# Patient Record
Sex: FEMALE | Race: WHITE | Employment: FULL TIME | URBAN - METROPOLITAN AREA
[De-identification: names, ages, dates, MRNs, and addresses within clinical notes are randomized per-mention and may not be internally consistent; named-entity substitution may affect disease eponyms.]

---

## 2017-05-12 LAB — MAMMOGRAPHY, EXTERNAL: ABNORMAL

## 2018-09-28 ENCOUNTER — HOSPITAL ENCOUNTER (EMERGENCY)
Age: 48
Discharge: HOME | End: 2018-09-28
Attending: EMERGENCY MEDICINE
Payer: COMMERCIAL

## 2018-09-28 ENCOUNTER — APPOINTMENT (OUTPATIENT)
Dept: CT IMAGING | Age: 48
End: 2018-09-28
Payer: COMMERCIAL

## 2018-09-28 VITALS
DIASTOLIC BLOOD PRESSURE: 66 MMHG | RESPIRATION RATE: 14 BRPM | HEIGHT: 66 IN | WEIGHT: 125 LBS | BODY MASS INDEX: 20.09 KG/M2 | HEART RATE: 76 BPM | OXYGEN SATURATION: 98 % | TEMPERATURE: 98.2 F | SYSTOLIC BLOOD PRESSURE: 126 MMHG

## 2018-09-28 DIAGNOSIS — S76.012D STRAIN OF LEFT HIP, SUBSEQUENT ENCOUNTER: ICD-10-CM

## 2018-09-28 DIAGNOSIS — S09.90XA CLOSED HEAD INJURY, INITIAL ENCOUNTER: Primary | ICD-10-CM

## 2018-09-28 DIAGNOSIS — S01.81XA CHIN LACERATION, INITIAL ENCOUNTER: ICD-10-CM

## 2018-09-28 PROCEDURE — 90471 IMMUNIZATION ADMIN: CPT | Performed by: EMERGENCY MEDICINE

## 2018-09-28 PROCEDURE — 6370000000 HC RX 637 (ALT 250 FOR IP): Performed by: EMERGENCY MEDICINE

## 2018-09-28 PROCEDURE — 6360000002 HC RX W HCPCS: Performed by: EMERGENCY MEDICINE

## 2018-09-28 PROCEDURE — 12011 RPR F/E/E/N/L/M 2.5 CM/<: CPT

## 2018-09-28 PROCEDURE — 70486 CT MAXILLOFACIAL W/O DYE: CPT

## 2018-09-28 PROCEDURE — 90715 TDAP VACCINE 7 YRS/> IM: CPT | Performed by: EMERGENCY MEDICINE

## 2018-09-28 PROCEDURE — 99284 EMERGENCY DEPT VISIT MOD MDM: CPT

## 2018-09-28 RX ORDER — CYCLOBENZAPRINE HCL 10 MG
10 TABLET ORAL 3 TIMES DAILY PRN
Qty: 20 TABLET | Refills: 0 | Status: SHIPPED | OUTPATIENT
Start: 2018-09-28 | End: 2018-10-08

## 2018-09-28 RX ORDER — ORPHENADRINE CITRATE 100 MG/1
100 TABLET, EXTENDED RELEASE ORAL ONCE
Status: COMPLETED | OUTPATIENT
Start: 2018-09-28 | End: 2018-09-28

## 2018-09-28 RX ORDER — IBUPROFEN 600 MG/1
600 TABLET ORAL ONCE
Status: COMPLETED | OUTPATIENT
Start: 2018-09-28 | End: 2018-09-28

## 2018-09-28 RX ORDER — HYDROCODONE BITARTRATE AND ACETAMINOPHEN 5; 325 MG/1; MG/1
1 TABLET ORAL 3 TIMES DAILY PRN
Qty: 12 TABLET | Refills: 0 | Status: SHIPPED | OUTPATIENT
Start: 2018-09-28 | End: 2018-10-01

## 2018-09-28 RX ADMIN — IBUPROFEN 600 MG: 600 TABLET ORAL at 08:14

## 2018-09-28 RX ADMIN — ORPHENADRINE CITRATE 100 MG: 100 TABLET, EXTENDED RELEASE ORAL at 08:15

## 2018-09-28 RX ADMIN — TETANUS TOXOID, REDUCED DIPHTHERIA TOXOID AND ACELLULAR PERTUSSIS VACCINE, ADSORBED 0.5 ML: 5; 2.5; 8; 8; 2.5 SUSPENSION INTRAMUSCULAR at 08:13

## 2018-09-28 ASSESSMENT — PAIN SCALES - GENERAL
PAINLEVEL_OUTOF10: 7
PAINLEVEL_OUTOF10: 8
PAINLEVEL_OUTOF10: 4
PAINLEVEL_OUTOF10: 8

## 2018-09-28 ASSESSMENT — PAIN DESCRIPTION - LOCATION
LOCATION: LEG

## 2018-09-28 ASSESSMENT — PAIN DESCRIPTION - DESCRIPTORS: DESCRIPTORS: ACHING;CONSTANT;DISCOMFORT

## 2018-09-28 ASSESSMENT — PAIN DESCRIPTION - PAIN TYPE
TYPE: ACUTE PAIN
TYPE: ACUTE PAIN

## 2018-09-28 ASSESSMENT — PAIN DESCRIPTION - ORIENTATION: ORIENTATION: LEFT;UPPER

## 2018-09-28 NOTE — ED PROVIDER NOTES
and vital signs as below have been reviewed. /66   Pulse 76   Temp 98.2 °F (36.8 °C)   Resp 14   Ht 5' 6\" (1.676 m)   Wt 125 lb (56.7 kg)   LMP 2018   SpO2 98%   BMI 20.18 kg/m²   Oxygen Saturation Interpretation: Normal      ---------------------------------------------------PHYSICAL EXAM--------------------------------------      Constitutional/General: Alert and oriented x3, well appearing, non toxic in NAD  Head: NC, 2.5 cm laceration on chin. No active bleeding. No foreign bodies visualized. Eyes: PERRL, EOMI  Mouth: Oropharynx clear, handling secretions, no trismus. Negative tongue blade test. Mild crepitance noted at the right TMJ. No loose dentition. No crepitance of the mandible. Neck: Supple, full ROM, no midline cervical spine tenderness  Pulmonary: Lungs clear to auscultation bilaterally. Not in respiratory distress  Cardiovascular:  Regular rate and rhythm. 2+ distal pulses  Abdomen: Soft, non tender, non distended,   Back: No midline thoracic or lumbar spine tenderness. No bony crepitance. Extremities: Moves all extremities x 4. Warm and well perfused. Patient has small abrasions to both wrists on the ventral surface. No tenderness or deformities of bilateral wrists. Normal range of motion. Small abrasions also noted to the knees without bony tenderness. Tenderness of the left hamstrings. No overlying erythema or ecchymosis. Skin: warm and dry without rash  Neurologic: GCS 15,  Psych: Normal Affect      ------------------------------ ED COURSE/MEDICAL DECISION MAKING----------------------  Medications   Tetanus-Diphth-Acell Pertussis (BOOSTRIX) injection 0.5 mL (0.5 mLs Intramuscular Given 18 0813)   ibuprofen (ADVIL;MOTRIN) tablet 600 mg (600 mg Oral Given 18 0814)   orphenadrine (NORFLEX) extended release tablet 100 mg (100 mg Oral Given 1815)         Medical Decision Makin  Discussed results of imaging with patient.  She states she will follow-up with Dr. Dora Rodriguez who has been following her for this hip pain. She was offered crutches but states she has not home. She'll be prescribed something for pain and a muscle relaxer. LACERATION REPAIR  PROCEDURE NOTE:  Unless otherwise indicated, this procedure was done or directly supervised by the ED attending. Laceration #: 1. Location: chin  Length: 2.5cm. The wound area was irrigated with sterile saline and draped in a sterile fashion. The wound area was anesthetized with Lidocaine 1% without epinephrine. WOUND COMPLEXITY:    Debridement: None. Undermining: None. Wound Margins Revised: None. Flaps Aligned: yes. The wound was explored with the following results No foreign bodies found. The wound was closed with 5-0 Ethilon using interrupted sutures    Total number suture: 4  Counseling: The emergency provider has spoken with the patient and discussed todays results, in addition to providing specific details for the plan of care and counseling regarding the diagnosis and prognosis. Questions are answered at this time and they are agreeable with the plan.      --------------------------------- IMPRESSION AND DISPOSITION ---------------------------------    IMPRESSION  1. Closed head injury, initial encounter    2. Chin laceration, initial encounter    3.  Strain of left hip, subsequent encounter        DISPOSITION  Disposition: Discharge to home  Patient condition is good                Carlie Mcmanus DO  09/28/18 8414

## 2018-09-28 NOTE — ED NOTES
Laceration chin area abrasion nose pain on palpation left upper leg area pt alert denies hitting head     Mark Smith RN  09/28/18 9977

## 2021-03-18 ENCOUNTER — IMMUNIZATION (OUTPATIENT)
Dept: PRIMARY CARE CLINIC | Age: 51
End: 2021-03-18
Payer: COMMERCIAL

## 2021-03-18 PROCEDURE — 91301 COVID-19, MODERNA VACCINE 100MCG/0.5ML DOSE: CPT | Performed by: NURSE PRACTITIONER

## 2021-03-18 PROCEDURE — 0011A COVID-19, MODERNA VACCINE 100MCG/0.5ML DOSE: CPT | Performed by: NURSE PRACTITIONER

## 2021-04-15 ENCOUNTER — IMMUNIZATION (OUTPATIENT)
Dept: PRIMARY CARE CLINIC | Age: 51
End: 2021-04-15
Payer: COMMERCIAL

## 2021-04-15 PROCEDURE — 91301 COVID-19, MODERNA VACCINE 100MCG/0.5ML DOSE: CPT | Performed by: NURSE PRACTITIONER

## 2021-04-15 PROCEDURE — 0012A COVID-19, MODERNA VACCINE 100MCG/0.5ML DOSE: CPT | Performed by: NURSE PRACTITIONER

## 2021-04-18 ENCOUNTER — HOSPITAL ENCOUNTER (EMERGENCY)
Age: 51
Discharge: HOME | End: 2021-04-18
Payer: COMMERCIAL

## 2021-04-18 VITALS
RESPIRATION RATE: 16 BRPM | HEART RATE: 76 BPM | OXYGEN SATURATION: 98 % | WEIGHT: 125 LBS | SYSTOLIC BLOOD PRESSURE: 124 MMHG | DIASTOLIC BLOOD PRESSURE: 74 MMHG | HEIGHT: 66 IN | BODY MASS INDEX: 20.09 KG/M2 | TEMPERATURE: 98.2 F

## 2021-04-18 DIAGNOSIS — N30.01 ACUTE CYSTITIS WITH HEMATURIA: Primary | ICD-10-CM

## 2021-04-18 LAB
BACTERIA: ABNORMAL /HPF
BILIRUBIN URINE: NEGATIVE
BLOOD, URINE: ABNORMAL
CLARITY: ABNORMAL
COLOR: YELLOW
GLUCOSE URINE: NEGATIVE MG/DL
HCG(URINE) PREGNANCY TEST: NEGATIVE
KETONES, URINE: NEGATIVE MG/DL
LEUKOCYTE ESTERASE, URINE: ABNORMAL
NITRITE, URINE: POSITIVE
PH UA: 5 (ref 5–9)
PROTEIN UA: 100 MG/DL
RBC UA: >20 /HPF (ref 0–2)
SPECIFIC GRAVITY UA: >=1.03 (ref 1–1.03)
UROBILINOGEN, URINE: 0.2 E.U./DL
WBC UA: >20 /HPF (ref 0–5)

## 2021-04-18 PROCEDURE — 99211 OFF/OP EST MAY X REQ PHY/QHP: CPT

## 2021-04-18 PROCEDURE — 81025 URINE PREGNANCY TEST: CPT

## 2021-04-18 PROCEDURE — 87186 SC STD MICRODIL/AGAR DIL: CPT

## 2021-04-18 PROCEDURE — 81001 URINALYSIS AUTO W/SCOPE: CPT

## 2021-04-18 PROCEDURE — 87088 URINE BACTERIA CULTURE: CPT

## 2021-04-18 RX ORDER — PHENAZOPYRIDINE HYDROCHLORIDE 200 MG/1
200 TABLET, FILM COATED ORAL 3 TIMES DAILY PRN
Qty: 6 TABLET | Refills: 0 | Status: SHIPPED | OUTPATIENT
Start: 2021-04-18 | End: 2021-04-20

## 2021-04-18 RX ORDER — BUPROPION HYDROCHLORIDE 300 MG/1
300 TABLET ORAL EVERY MORNING
COMMUNITY

## 2021-04-18 RX ORDER — CEFDINIR 300 MG/1
300 CAPSULE ORAL 2 TIMES DAILY
Qty: 14 CAPSULE | Refills: 0 | Status: SHIPPED | OUTPATIENT
Start: 2021-04-18 | End: 2021-04-25

## 2021-04-21 LAB
ORGANISM: ABNORMAL
URINE CULTURE, ROUTINE: ABNORMAL

## 2021-05-24 LAB — PAP SMEAR, EXTERNAL: NORMAL

## 2021-11-22 LAB — MAMMOGRAPHY, EXTERNAL: NORMAL

## 2022-05-26 LAB
HPV, INTERPRETATION: NOT DETECTED
PAP SMEAR, EXTERNAL: NORMAL
PAP SMEAR: NORMAL

## 2022-07-24 ENCOUNTER — HOSPITAL ENCOUNTER (EMERGENCY)
Age: 52
Discharge: HOME | End: 2022-07-24
Payer: COMMERCIAL

## 2022-07-24 VITALS
TEMPERATURE: 97.5 F | SYSTOLIC BLOOD PRESSURE: 121 MMHG | DIASTOLIC BLOOD PRESSURE: 86 MMHG | WEIGHT: 128 LBS | OXYGEN SATURATION: 100 % | BODY MASS INDEX: 20.57 KG/M2 | HEIGHT: 66 IN | RESPIRATION RATE: 20 BRPM | HEART RATE: 73 BPM

## 2022-07-24 DIAGNOSIS — R19.7 DIARRHEA, UNSPECIFIED TYPE: Primary | ICD-10-CM

## 2022-07-24 PROCEDURE — 99211 OFF/OP EST MAY X REQ PHY/QHP: CPT

## 2022-07-24 PROCEDURE — 87324 CLOSTRIDIUM AG IA: CPT

## 2022-07-24 PROCEDURE — 87449 NOS EACH ORGANISM AG IA: CPT

## 2022-07-24 ASSESSMENT — PAIN - FUNCTIONAL ASSESSMENT: PAIN_FUNCTIONAL_ASSESSMENT: NONE - DENIES PAIN

## 2022-07-24 NOTE — ED PROVIDER NOTES
[facility]  Department of Emergency Medicine  UC Encounter Note  22   9:05 AM EDT      NAME: Tristin Keita  :  1970  MRN:  [id number]    Chief Complaint: Diarrhea (Started week ago after being on antibiotic  for uti   has not stopped)      This is a 78-year-old female the presents to urgent care complaining of diarrhea x1 week. Patient states that about a week ago or so she was placed on an antibiotic Bactrim for urinary tract infection. She had only taken it 2 days when she started having diarrhea. She complains of a liquid stool. But she goes multiple times throughout the day. She denies any fevers or chills. No abdominal pain no nausea or vomiting. She does state that she is keeping up with her food and fluid intake. She denies any back pain. She denies any urinary symptoms at this time. She denies any recent travel. On first contact patient she is in no acute distress. She has not tried anything for diarrhea yet. Review of Systems  Pertinent positives and negatives are stated within HPI, all other systems reviewed and are negative. Physical Exam  Vitals and nursing note reviewed. Constitutional:       Appearance: She is well-developed. HENT:      Head: Normocephalic and atraumatic. Right Ear: Hearing and external ear normal.      Left Ear: Hearing and external ear normal.      Nose: Nose normal.      Mouth/Throat:      Pharynx: Uvula midline. Eyes:      General: Lids are normal.      Conjunctiva/sclera: Conjunctivae normal.      Pupils: Pupils are equal, round, and reactive to light. Cardiovascular:      Rate and Rhythm: Normal rate and regular rhythm. Heart sounds: Normal heart sounds. No murmur heard. Pulmonary:      Effort: Pulmonary effort is normal.      Breath sounds: Normal breath sounds. Abdominal:      General: Bowel sounds are normal.      Palpations: Abdomen is soft. Abdomen is not rigid. Tenderness:  There is no abdominal tenderness. There is no right CVA tenderness, left CVA tenderness, guarding or rebound. Musculoskeletal:      Cervical back: Normal range of motion and neck supple. Skin:     General: Skin is warm and dry. Findings: No abrasion or rash. Neurological:      Mental Status: She is alert and oriented to person, place, and time. GCS: GCS eye subscore is 4. GCS verbal subscore is 5. GCS motor subscore is 6. Cranial Nerves: No cranial nerve deficit. Sensory: No sensory deficit. Coordination: Coordination normal.      Gait: Gait normal.       Procedures    MDM  Number of Diagnoses or Management Options  Diarrhea, unspecified type  Diagnosis management comments: Patient in no acute distress. Abdomen is benign. We did discuss treatment options I told her at this point try some Imodium I will give her some instructions. Also if she can give a stool sample we can check it for C. difficile. I did tell her to follow-up very closely with her primary care provider if worse go to the ER. Instructions given.             --------------------------------------------- PAST HISTORY ---------------------------------------------  Past Medical History:  has a past medical history of Depression. Past Surgical History:  has no past surgical history on file. Social History:  reports that she has never smoked. She has never used smokeless tobacco. She reports that she does not drink alcohol and does not use drugs. Family History: family history is not on file. The patients home medications have been reviewed. Allergies: Patient has no known allergies. -------------------------------------------------- RESULTS -------------------------------------------------  No results found for this visit on 07/24/22.   No orders to display       ------------------------- NURSING NOTES AND VITALS REVIEWED ---------------------------   The nursing notes within the ED encounter and vital signs as below have been reviewed. /86   Pulse 73   Temp 97.5 °F (36.4 °C) (Infrared)   Resp 20   Ht 5' 6\" (1.676 m)   Wt 128 lb (58.1 kg)   LMP 04/16/2021   SpO2 100%   BMI 20.66 kg/m²   Oxygen Saturation Interpretation: Normal      ------------------------------------------ PROGRESS NOTES ------------------------------------------   I have spoken with the patient and discussed todays results, in addition to providing specific details for the plan of care and counseling regarding the diagnosis and prognosis. Their questions are answered at this time and they are agreeable with the plan.      --------------------------------- ADDITIONAL PROVIDER NOTES ---------------------------------     This patient is stable for discharge. I have shared the specific conditions for return, as well as the importance of follow-up. * NOTE: This report was transcribed using voice recognition software. Every effort was made to ensure accuracy; however, inadvertent computerized transcription errors may be present.    --------------------------------- IMPRESSION AND DISPOSITION ---------------------------------    IMPRESSION  1.  Diarrhea, unspecified type        DISPOSITION  Disposition: Discharge to home  Patient condition is good       Arminda Koch PA-C  07/24/22 2135

## 2022-07-24 NOTE — DISCHARGE INSTRUCTIONS
Imodium:  Initial: 4 mg, followed by 2 mg every 2 to 4 hours or after each loose stool: maximum is 16 mg/day.      Sample needed for Cdiff

## 2022-07-25 LAB — C DIFF TOXIN/ANTIGEN: NORMAL

## 2022-07-31 ENCOUNTER — HOSPITAL ENCOUNTER (EMERGENCY)
Age: 52
Discharge: HOME | End: 2022-07-31
Payer: COMMERCIAL

## 2022-07-31 VITALS
BODY MASS INDEX: 20.18 KG/M2 | OXYGEN SATURATION: 99 % | SYSTOLIC BLOOD PRESSURE: 112 MMHG | HEART RATE: 88 BPM | TEMPERATURE: 97.7 F | RESPIRATION RATE: 16 BRPM | WEIGHT: 125 LBS | DIASTOLIC BLOOD PRESSURE: 81 MMHG

## 2022-07-31 DIAGNOSIS — N30.01 ACUTE CYSTITIS WITH HEMATURIA: Primary | ICD-10-CM

## 2022-07-31 LAB
BACTERIA: ABNORMAL /HPF
BILIRUBIN URINE: ABNORMAL
BLOOD, URINE: ABNORMAL
CLARITY: ABNORMAL
COLOR: ABNORMAL
EPITHELIAL CELLS, UA: ABNORMAL /HPF
GLUCOSE URINE: NEGATIVE MG/DL
KETONES, URINE: NEGATIVE MG/DL
LEUKOCYTE ESTERASE, URINE: ABNORMAL
NITRITE, URINE: NEGATIVE
PH UA: 7 (ref 5–9)
PROTEIN UA: >=300 MG/DL
RBC UA: ABNORMAL /HPF (ref 0–2)
SPECIFIC GRAVITY UA: 1.02 (ref 1–1.03)
UROBILINOGEN, URINE: 0.2 E.U./DL
WBC UA: >20 /HPF (ref 0–5)

## 2022-07-31 PROCEDURE — 99211 OFF/OP EST MAY X REQ PHY/QHP: CPT

## 2022-07-31 PROCEDURE — 81001 URINALYSIS AUTO W/SCOPE: CPT

## 2022-07-31 RX ORDER — CIPROFLOXACIN 500 MG/1
500 TABLET, FILM COATED ORAL 2 TIMES DAILY
Qty: 20 TABLET | Refills: 0 | Status: SHIPPED | OUTPATIENT
Start: 2022-07-31 | End: 2022-08-10

## 2022-07-31 ASSESSMENT — PAIN SCALES - GENERAL: PAINLEVEL_OUTOF10: 0

## 2022-07-31 ASSESSMENT — PAIN - FUNCTIONAL ASSESSMENT: PAIN_FUNCTIONAL_ASSESSMENT: 0-10

## 2022-07-31 NOTE — ED PROVIDER NOTES
HPI:  7/31/22, Time: 10:54 AM EDT         Slick Forde is a 45 y.o. female presenting to the ED for dysuria, urinary urgency, suprapubic pain/pressure, beginning this morning. The complaint has been persistent, mild in severity, and worsened by nothing. She reports similar symptoms with urinary tract infections in the past.  Is also having some hematuria with it. Denies any back pain. Afebrile without recent travel or sick contacts. No nausea vomiting or diarrhea. Was recently prescribed an antibiotic on 7/13/2022 for urinary tract infection. She was prescribed Bactrim however discontinued after 2 days due to the diarrhea. This is since resolved and urinary tract infection symptoms resolved after the 2 days of Bactrim however they did start again this morning. Patient denies all other symptoms at this time. Review of Systems:   A complete review of systems was performed and pertinent positives and negatives are stated within HPI, all other systems reviewed and are negative.          --------------------------------------------- PAST HISTORY ---------------------------------------------  Past Medical History:  has a past medical history of Depression. Past Surgical History:  has no past surgical history on file. Social History:  reports that she has never smoked. She has never used smokeless tobacco. She reports that she does not drink alcohol and does not use drugs. Family History: family history is not on file. The patients home medications have been reviewed. Allergies: Patient has no known allergies.     -------------------------------------------------- RESULTS -------------------------------------------------  All laboratory and radiology results have been personally reviewed by myself   LABS:  Results for orders placed or performed during the hospital encounter of 07/31/22   Urinalysis   Result Value Ref Range    Color, UA RED (A) Straw/Yellow    Clarity, UA CLOUDY (A) Clear Glucose, Ur Negative Negative mg/dL    Bilirubin Urine SMALL (A) Negative    Ketones, Urine Negative Negative mg/dL    Specific Gravity, UA 1.025 1.005 - 1.030    Blood, Urine LARGE (A) Negative    pH, UA 7.0 5.0 - 9.0    Protein, UA >=300 (A) Negative mg/dL    Urobilinogen, Urine 0.2 <2.0 E.U./dL    Nitrite, Urine Negative Negative    Leukocyte Esterase, Urine SMALL (A) Negative   Microscopic Urinalysis   Result Value Ref Range    WBC, UA >20 (A) 0 - 5 /HPF    RBC, UA PACKED 0 - 2 /HPF    Epithelial Cells, UA RARE /HPF    Bacteria, UA FEW (A) None Seen /HPF       RADIOLOGY:  Interpreted by Radiologist.  No orders to display       ------------------------- NURSING NOTES AND VITALS REVIEWED ---------------------------   The nursing notes within the ED encounter and vital signs as below have been reviewed. /81   Pulse 88   Temp 97.7 °F (36.5 °C)   Resp 16   Wt 125 lb (56.7 kg)   LMP 04/16/2021   SpO2 99%   BMI 20.18 kg/m²   Oxygen Saturation Interpretation: Normal      ---------------------------------------------------PHYSICAL EXAM--------------------------------------      Constitutional/General: Alert and oriented x3, well appearing, non toxic in NAD  Head: Normocephalic and atraumatic  Eyes: PERRL, EOMI  Mouth: Oropharynx clear, handling secretions, no trismus  Neck: Supple, full ROM,   Abdomen: Soft, non tender, non distended,   Extremities: Moves all extremities x 4. Warm and well perfused  Skin: warm and dry without rash  Neurologic: GCS 15,  Psych: Normal Affect      ------------------------------ ED COURSE/MEDICAL DECISION MAKING----------------------  Medications - No data to display      ED COURSE:       Medical Decision Making:    Patient is a 49-year-old female presenting to urgent care today with signs and symptoms of urinary tract infection. There is evidence of UTI on urinalysis.   Otherwise patient is nontoxic-appearing, afebrile, no acute distress therefore plan on outpatient management with antibiotics. Patient reports that she generally gets ciprofloxacin for her urinary tract infections. Understands the risks of taking this medication. Advised follow-up with PCP for recheck and return to the emergency department with any new or worsening symptoms. Patient voiced understanding is agreeable to the above treatment plan. Counseling: The emergency provider has spoken with the patient and discussed todays results, in addition to providing specific details for the plan of care and counseling regarding the diagnosis and prognosis. Questions are answered at this time and they are agreeable with the plan.      --------------------------------- IMPRESSION AND DISPOSITION ---------------------------------    IMPRESSION  1. Acute cystitis with hematuria        DISPOSITION  Disposition: Discharge to home  Patient condition is stable      NOTE: This report was transcribed using voice recognition software.  Every effort was made to ensure accuracy; however, inadvertent computerized transcription errors may be present        Sara Pool  07/31/22 1112

## 2023-07-19 LAB
BILIRUBIN, URINE: NEGATIVE
BLOOD, URINE: NEGATIVE
CLARITY, UA: CLEAR
COLOR, UA: YELLOW
GLUCOSE URINE: NEGATIVE
KETONES, URINE: NEGATIVE
LEUKOCYTE ESTERASE, URINE: NEGATIVE
NITRITE, URINE: NEGATIVE
PH UA: 7 (ref 4.5–8)
PROTEIN UA: NEGATIVE
SPECIFIC GRAVITY UA: 1.01 (ref 1–1.03)
UROBILINOGEN, URINE: NORMAL

## 2024-12-11 LAB
ALBUMIN: 4.5 G/DL
ALP BLD-CCNC: 43 U/L
ALT SERPL-CCNC: 15 U/L
ANION GAP SERPL CALCULATED.3IONS-SCNC: NORMAL MMOL/L
AST SERPL-CCNC: 19 U/L
BASOPHILS ABSOLUTE: 21 /ΜL
BASOPHILS RELATIVE PERCENT: 0.4 %
BILIRUB SERPL-MCNC: 0.5 MG/DL (ref 0.1–1.4)
BUN BLDV-MCNC: 17 MG/DL
CALCIUM SERPL-MCNC: 9.8 MG/DL
CHLORIDE BLD-SCNC: 104 MMOL/L
CHOLESTEROL, TOTAL: 229 MG/DL
CHOLESTEROL/HDL RATIO: 3.1
CO2: 30 MMOL/L
CREAT SERPL-MCNC: 0.73 MG/DL
EOSINOPHILS ABSOLUTE: 42 /ΜL
EOSINOPHILS RELATIVE PERCENT: 0.8 %
ESTIMATED AVERAGE GLUCOSE: ABNORMAL
GFR, ESTIMATED: 96
GLUCOSE BLD-MCNC: 88 MG/DL
HBA1C MFR BLD: 5.9 %
HCT VFR BLD CALC: 41.7 % (ref 36–46)
HDLC SERPL-MCNC: 73 MG/DL (ref 35–70)
HEMOGLOBIN: 13.7 G/DL (ref 12–16)
LDL CHOLESTEROL: 135
LYMPHOCYTES ABSOLUTE: 2369 /ΜL
LYMPHOCYTES RELATIVE PERCENT: 44.7 %
MAMMOGRAPHY, EXTERNAL: ABNORMAL
MCH RBC QN AUTO: 30 PG
MCHC RBC AUTO-ENTMCNC: 32.9 G/DL
MCV RBC AUTO: 91.2 FL
MONOCYTES ABSOLUTE: 350 /ΜL
MONOCYTES RELATIVE PERCENT: 6.6 %
NEUTROPHILS ABSOLUTE: 2518 /ΜL
NEUTROPHILS RELATIVE PERCENT: 47.5 %
NONHDLC SERPL-MCNC: 156 MG/DL
PLATELET # BLD: 232 K/ΜL
PMV BLD AUTO: 12.4 FL
POTASSIUM SERPL-SCNC: 3.9 MMOL/L
RBC # BLD: 4.57 10^6/ΜL
SODIUM BLD-SCNC: 142 MMOL/L
TOTAL PROTEIN: 6.5 G/DL (ref 6.4–8.2)
TRIGL SERPL-MCNC: 103 MG/DL
VLDLC SERPL CALC-MCNC: ABNORMAL MG/DL
WBC # BLD: 5.3 10^3/ML

## 2025-01-26 SDOH — HEALTH STABILITY: PHYSICAL HEALTH: ON AVERAGE, HOW MANY DAYS PER WEEK DO YOU ENGAGE IN MODERATE TO STRENUOUS EXERCISE (LIKE A BRISK WALK)?: 5 DAYS

## 2025-01-26 SDOH — HEALTH STABILITY: PHYSICAL HEALTH: ON AVERAGE, HOW MANY MINUTES DO YOU ENGAGE IN EXERCISE AT THIS LEVEL?: 40 MIN

## 2025-01-27 ENCOUNTER — HOSPITAL ENCOUNTER (OUTPATIENT)
Dept: GENERAL RADIOLOGY | Age: 55
Discharge: HOME | End: 2025-01-29
Payer: COMMERCIAL

## 2025-01-27 ENCOUNTER — HOSPITAL ENCOUNTER (OUTPATIENT)
Age: 55
Discharge: HOME | End: 2025-01-29
Payer: COMMERCIAL

## 2025-01-27 ENCOUNTER — OFFICE VISIT (OUTPATIENT)
Dept: INTERNAL MEDICINE | Age: 55
End: 2025-01-27
Payer: COMMERCIAL

## 2025-01-27 VITALS
BODY MASS INDEX: 21.53 KG/M2 | DIASTOLIC BLOOD PRESSURE: 79 MMHG | TEMPERATURE: 97.2 F | WEIGHT: 134 LBS | HEIGHT: 66 IN | SYSTOLIC BLOOD PRESSURE: 110 MMHG | HEART RATE: 97 BPM | RESPIRATION RATE: 18 BRPM | OXYGEN SATURATION: 99 %

## 2025-01-27 DIAGNOSIS — R10.2 PELVIC PAIN: ICD-10-CM

## 2025-01-27 DIAGNOSIS — K59.01 SLOW TRANSIT CONSTIPATION: ICD-10-CM

## 2025-01-27 DIAGNOSIS — R10.2 PELVIC PAIN: Primary | ICD-10-CM

## 2025-01-27 DIAGNOSIS — Z78.0 POSTMENOPAUSAL ESTROGEN DEFICIENCY: ICD-10-CM

## 2025-01-27 DIAGNOSIS — R43.0 LOSS OF SMELL: ICD-10-CM

## 2025-01-27 PROBLEM — M26.55 CENTRIC OCCLUSION MAXIMUM INTERCUSPATION DISCREPANCY: Status: ACTIVE | Noted: 2019-07-25

## 2025-01-27 PROBLEM — M26.55 CENTRIC OCCLUSION MAXIMUM INTERCUSPATION DISCREPANCY: Status: RESOLVED | Noted: 2019-07-25 | Resolved: 2025-01-27

## 2025-01-27 PROBLEM — S02.641A CLOSED FRACTURE OF RIGHT RAMUS OF MANDIBLE: Status: RESOLVED | Noted: 2019-07-25 | Resolved: 2025-01-27

## 2025-01-27 PROBLEM — G47.63 SLEEP RELATED BRUXISM: Status: ACTIVE | Noted: 2024-12-20

## 2025-01-27 PROBLEM — S02.609G: Status: ACTIVE | Noted: 2019-01-11

## 2025-01-27 PROBLEM — K56.41 OBSTRUCTIVE DEFECATION (HCC): Status: RESOLVED | Noted: 2019-09-27 | Resolved: 2025-01-27

## 2025-01-27 PROBLEM — R27.8 MUSCULAR INCOORDINATION: Status: ACTIVE | Noted: 2019-10-23

## 2025-01-27 PROBLEM — S02.641A CLOSED FRACTURE OF RIGHT RAMUS OF MANDIBLE: Status: ACTIVE | Noted: 2019-07-25

## 2025-01-27 PROBLEM — M62.89: Status: RESOLVED | Noted: 2023-04-21 | Resolved: 2025-01-27

## 2025-01-27 PROBLEM — K56.41 OBSTRUCTIVE DEFECATION (HCC): Status: ACTIVE | Noted: 2019-09-27

## 2025-01-27 PROBLEM — M62.89: Status: ACTIVE | Noted: 2023-04-21

## 2025-01-27 PROBLEM — S02.609G: Status: RESOLVED | Noted: 2019-01-11 | Resolved: 2025-01-27

## 2025-01-27 PROBLEM — F41.9 ANXIETY: Status: ACTIVE | Noted: 2020-06-19

## 2025-01-27 PROCEDURE — 99204 OFFICE O/P NEW MOD 45 MIN: CPT | Performed by: INTERNAL MEDICINE

## 2025-01-27 PROCEDURE — 73521 X-RAY EXAM HIPS BI 2 VIEWS: CPT

## 2025-01-27 RX ORDER — CICLOPIROX 80 MG/ML
SOLUTION TOPICAL
Qty: 6 ML | Refills: 1 | Status: SHIPPED | OUTPATIENT
Start: 2025-01-27

## 2025-01-27 SDOH — ECONOMIC STABILITY: FOOD INSECURITY: WITHIN THE PAST 12 MONTHS, THE FOOD YOU BOUGHT JUST DIDN'T LAST AND YOU DIDN'T HAVE MONEY TO GET MORE.: NEVER TRUE

## 2025-01-27 SDOH — ECONOMIC STABILITY: FOOD INSECURITY: WITHIN THE PAST 12 MONTHS, YOU WORRIED THAT YOUR FOOD WOULD RUN OUT BEFORE YOU GOT MONEY TO BUY MORE.: NEVER TRUE

## 2025-01-27 ASSESSMENT — PATIENT HEALTH QUESTIONNAIRE - PHQ9
SUM OF ALL RESPONSES TO PHQ QUESTIONS 1-9: 0
SUM OF ALL RESPONSES TO PHQ QUESTIONS 1-9: 0
2. FEELING DOWN, DEPRESSED OR HOPELESS: NOT AT ALL
SUM OF ALL RESPONSES TO PHQ QUESTIONS 1-9: 0
SUM OF ALL RESPONSES TO PHQ QUESTIONS 1-9: 0
1. LITTLE INTEREST OR PLEASURE IN DOING THINGS: NOT AT ALL
SUM OF ALL RESPONSES TO PHQ9 QUESTIONS 1 & 2: 0

## 2025-01-27 ASSESSMENT — LIFESTYLE VARIABLES
HOW MANY STANDARD DRINKS CONTAINING ALCOHOL DO YOU HAVE ON A TYPICAL DAY: 1 OR 2
HOW OFTEN DO YOU HAVE A DRINK CONTAINING ALCOHOL: MONTHLY OR LESS

## 2025-01-27 NOTE — PROGRESS NOTES
[facility] Internal Medicine      SUBJECTIVE:  Branide Ojeda (:  1970) is a 54 y.o. female here for evaluation of the following chief complaint(s):  Establish Care    Assessment & Plan  1. Hip/pelvic pain.  The hip pain could potentially be indicative of a stress fracture in the pelvis, given her high risk for osteoporosis as a thin white female. An x-ray of the bilateral hips and pelvis will be ordered to investigate the possibility of a stress fracture. A DEXA scan will also be scheduled to assess bone density. If the x-ray results are negative, a referral to Dr. Landa will be considered for further evaluation.  An MRI would need to be considered at that time as well.     2. Constipation.  She will be advised to take Colace once or twice daily to manage her constipation. Her previous medical records from Dr. Henriquez will be reviewed to ensure comprehensive care.    3. Loss of smell.  A referral to an ENT specialist will be made for further evaluation of her olfactory dysfunction.    4. Toenail fungus.  A prescription for Penlac will be provided, with instructions to apply it to the affected nails once daily. If there is no improvement in the toenail fungus after 3 to 6 months of Penlac application, a referral to a podiatrist will be considered.    5. Depression.  She is encouraged to contact the clinic if she experiences any depressive symptoms.    6. Health maintenance.  A DEXA scan will be scheduled to assess bone density. A one-time HIV and hepatitis C screen will be conducted during her next blood work appointment.    Follow-up  The patient is scheduled for a follow-up visit in 4 weeks.    Past Medical History:   Diagnosis Date    Depression      Past Surgical History:   Procedure Laterality Date    CYST REMOVAL      thyroid    DILATION AND CURETTAGE OF UTERUS      THYROGLOSSAL DUCT EXCISION      age 7    TONSILLECTOMY      age 1      She has had 3

## 2025-01-29 ENCOUNTER — TELEPHONE (OUTPATIENT)
Dept: ENT CLINIC | Age: 55
End: 2025-01-29

## 2025-01-29 ENCOUNTER — TELEPHONE (OUTPATIENT)
Dept: INTERNAL MEDICINE | Age: 55
End: 2025-01-29

## 2025-01-29 DIAGNOSIS — R10.2 PELVIC PAIN: Primary | ICD-10-CM

## 2025-01-29 NOTE — TELEPHONE ENCOUNTER
X-ray of hips is normal.  With ongoing pain with walking or running, will refer to Dr. Landa for further evaluation and work-up/treatment recommendations. Please let Christa know this.     Liz Newberry MD  1/29/2025

## 2025-01-29 NOTE — TELEPHONE ENCOUNTER
Scheduled 4/16/24 for New pt Ref: Dr Newberry, Loss of smell, CT sinus done 2018 [facility]. Patient asking to be seen sooner if possible by a Doctor Only. Chronic issue, but nose bleeds periodically [phone]

## 2025-02-03 SDOH — HEALTH STABILITY: PHYSICAL HEALTH: ON AVERAGE, HOW MANY MINUTES DO YOU ENGAGE IN EXERCISE AT THIS LEVEL?: 40 MIN

## 2025-02-03 SDOH — HEALTH STABILITY: PHYSICAL HEALTH: ON AVERAGE, HOW MANY DAYS PER WEEK DO YOU ENGAGE IN MODERATE TO STRENUOUS EXERCISE (LIKE A BRISK WALK)?: 5 DAYS

## 2025-02-04 ENCOUNTER — OFFICE VISIT (OUTPATIENT)
Dept: ORTHOPEDIC SURGERY | Age: 55
End: 2025-02-04

## 2025-02-04 VITALS
SYSTOLIC BLOOD PRESSURE: 105 MMHG | BODY MASS INDEX: 21.53 KG/M2 | DIASTOLIC BLOOD PRESSURE: 70 MMHG | TEMPERATURE: 98 F | OXYGEN SATURATION: 97 % | RESPIRATION RATE: 16 BRPM | HEIGHT: 66 IN | HEART RATE: 56 BPM | WEIGHT: 134 LBS

## 2025-02-04 DIAGNOSIS — M25.552 LEFT HIP PAIN: Primary | ICD-10-CM

## 2025-02-04 DIAGNOSIS — G89.29 CHRONIC RIGHT-SIDED LOW BACK PAIN WITHOUT SCIATICA: ICD-10-CM

## 2025-02-04 DIAGNOSIS — M54.50 CHRONIC RIGHT-SIDED LOW BACK PAIN WITHOUT SCIATICA: ICD-10-CM

## 2025-02-04 DIAGNOSIS — M25.551 RIGHT HIP PAIN: ICD-10-CM

## 2025-02-23 NOTE — PROGRESS NOTES
[facility] Internal Medicine      SUBJECTIVE:  Brandie Ojeda (:  1970) is a 54 y.o. female here for evaluation of the following chief complaint(s):  Follow-up (Hip pain, decreased sense of smell. )  1. Hip/pelvic pain.  Saw Dr. Landa on 2025.  Was referred for PT but continues to have symptoms.  Christa frustrated by this ongoing pain as she would like to continue her active lifestyle.   Will refer to Sports Medicine at  or [facility] - depending on where earliest appointment can be obtained. Also, given location, could originate in spine.  Will check Xray of lumbosacral spine as well as coccyx to look for etiology.      3. Loss of smell.  Christa has an ENT appointment on 2025. This may be due to sinus issues.     4. Toenail fungus.  Christa continues to use Penlac lacquer to treat this onychomycosis. Continue treatment.      6. Health maintenance.  A DEXA scan will be scheduled to assess bone density. A one-time HIV and hepatitis C screen are ordered as well.     Based on recent labs -   The 10-year ASCVD risk score (Jj ORR, et al., 2019) is: 1.5%    Values used to calculate the score:      Age: 54 years      Sex: Female      Is Non- : No      Diabetic: No      Tobacco smoker: No      Systolic Blood Pressure: 112 mmHg      Is BP treated: No      HDL Cholesterol: 73 mg/dL      Total Cholesterol: 229 mg/dL  Low risk score without any indication for statin therapy.     Depression - reports that she is over-thinking and ruminating. Exacerbated by family stressors.  Waking up at night as well. Has been on bupropion in the past with improvement in symptoms.     Will restart bupropion     Will check thyroid studies to ensure not contributing or exacerbating this symptom.     Review of Systems - as above    Current Outpatient Medications on File Prior to Visit   Medication Sig Dispense Refill    ciclopirox (PENLAC) 8 % solution Apply topically

## 2025-02-24 ENCOUNTER — OFFICE VISIT (OUTPATIENT)
Dept: INTERNAL MEDICINE | Age: 55
End: 2025-02-24
Payer: COMMERCIAL

## 2025-02-24 VITALS
HEART RATE: 85 BPM | OXYGEN SATURATION: 97 % | TEMPERATURE: 97.9 F | DIASTOLIC BLOOD PRESSURE: 60 MMHG | RESPIRATION RATE: 18 BRPM | SYSTOLIC BLOOD PRESSURE: 112 MMHG | HEIGHT: 66 IN | BODY MASS INDEX: 21.69 KG/M2 | WEIGHT: 135 LBS

## 2025-02-24 DIAGNOSIS — Z11.4 SCREENING FOR HIV (HUMAN IMMUNODEFICIENCY VIRUS): ICD-10-CM

## 2025-02-24 DIAGNOSIS — F41.9 ANXIETY AND DEPRESSION: ICD-10-CM

## 2025-02-24 DIAGNOSIS — K59.01 SLOW TRANSIT CONSTIPATION: ICD-10-CM

## 2025-02-24 DIAGNOSIS — Z11.59 NEED FOR HEPATITIS C SCREENING TEST: ICD-10-CM

## 2025-02-24 DIAGNOSIS — F32.A ANXIETY AND DEPRESSION: ICD-10-CM

## 2025-02-24 DIAGNOSIS — R10.2 PELVIC PAIN: Primary | ICD-10-CM

## 2025-02-24 LAB
HEPATITIS C ANTIBODY: NONREACTIVE
HIV AG/AB: NONREACTIVE
T4 FREE: 1.1 NG/DL (ref 0.9–1.7)
TSH SERPL DL<=0.05 MIU/L-ACNC: 1.58 UIU/ML (ref 0.27–4.2)

## 2025-02-24 PROCEDURE — 99214 OFFICE O/P EST MOD 30 MIN: CPT | Performed by: INTERNAL MEDICINE

## 2025-02-24 PROCEDURE — 99213 OFFICE O/P EST LOW 20 MIN: CPT | Performed by: INTERNAL MEDICINE

## 2025-02-24 PROCEDURE — 36415 COLL VENOUS BLD VENIPUNCTURE: CPT | Performed by: INTERNAL MEDICINE

## 2025-02-24 RX ORDER — BUPROPION HYDROCHLORIDE 150 MG/1
150 TABLET ORAL EVERY MORNING
Qty: 30 TABLET | Refills: 3 | Status: SHIPPED | OUTPATIENT
Start: 2025-02-24

## 2025-02-24 NOTE — PROGRESS NOTES
The following lab draw was performed today:    2 tubes of blood were drawn as follows:    1 gold top tube, tube expiration date 3/31/25  1 green top tube, tube expiration date 1/31/26      Using  #22 G x 1.5\" (black) straight needle and QuickShield safety tube mcintosh  #21 G x 1.5\" (green) straight needle and QuickShield safety tube mcintosh  #23 G x 0.75\" (blue) Butterfly Vacuette Safety Blood Collection Set & Mcintosh    From [facility] at 1020.    All tubes inverted 20 times. Tubes left in top-side down position while preparing paperwork. Labels attached and also taped for security. Placed in bag and sent to lab via tube system at 1043. See media.

## 2025-02-25 ENCOUNTER — HOSPITAL ENCOUNTER (OUTPATIENT)
Dept: GENERAL RADIOLOGY | Age: 55
Discharge: HOME | End: 2025-02-27
Payer: COMMERCIAL

## 2025-02-25 ENCOUNTER — HOSPITAL ENCOUNTER (OUTPATIENT)
Age: 55
Discharge: HOME | End: 2025-02-27
Payer: COMMERCIAL

## 2025-02-25 DIAGNOSIS — R10.2 PELVIC PAIN: ICD-10-CM

## 2025-02-25 PROCEDURE — 72220 X-RAY EXAM SACRUM TAILBONE: CPT

## 2025-02-25 PROCEDURE — 72100 X-RAY EXAM L-S SPINE 2/3 VWS: CPT

## 2025-03-24 ENCOUNTER — RESULTS FOLLOW-UP (OUTPATIENT)
Dept: GENERAL RADIOLOGY | Age: 55
End: 2025-03-24

## 2025-03-24 NOTE — TELEPHONE ENCOUNTER
At Tyson's last visit, I had placed a referral to sport medicine at [facility] or [facility].  Can the status of this referral be looked into?  I want to ensure she is established with someone who can provide her some relief.  Please let her know that her sacral X-ray was normal nd she had some disc narrowing in her lower lumbar spine though I do not think this explains her symptoms.     Liz Newberry MD  3/24/2025

## 2025-04-16 ENCOUNTER — OFFICE VISIT (OUTPATIENT)
Dept: ENT CLINIC | Age: 55
End: 2025-04-16
Payer: COMMERCIAL

## 2025-04-16 VITALS
HEART RATE: 108 BPM | DIASTOLIC BLOOD PRESSURE: 70 MMHG | BODY MASS INDEX: 21.13 KG/M2 | WEIGHT: 131.5 LBS | HEIGHT: 66 IN | SYSTOLIC BLOOD PRESSURE: 113 MMHG | TEMPERATURE: 98.2 F | OXYGEN SATURATION: 95 %

## 2025-04-16 DIAGNOSIS — R43.0 ANOSMIA: Primary | ICD-10-CM

## 2025-04-16 PROCEDURE — 99204 OFFICE O/P NEW MOD 45 MIN: CPT | Performed by: OTOLARYNGOLOGY

## 2025-04-16 RX ORDER — FLUTICASONE PROPIONATE 50 MCG
2 SPRAY, SUSPENSION (ML) NASAL DAILY
Qty: 16 G | Refills: 5 | Status: SHIPPED | OUTPATIENT
Start: 2025-04-16

## 2025-04-16 NOTE — PROGRESS NOTES
[facility]  Dr. Hogan. ROBERTO CARLOS Mcclure Ms.Ed.  New Consult       Patient Name:  Brandie Ojeda  :  1970     CHIEF C/O:    Chief Complaint   Patient presents with    New Patient     NEW PATIENT - New pt Ref: Dr Newberry, Loss of smell, CT sinus done 20       HISTORY OBTAINED FROM:  patient    HISTORY OF PRESENT ILLNESS:       Brandie is a 54 y.o. year old female, here today for change in smell. Pt reports decrease in smell over the last few years. She can smell odors just greatly decreased from baselines. Remote hx of facial trauma with broken mandible but otherwise denies recurrent sinus infections. Using saline intermittently. Occasional bloody crusting with blood when blows nose but no episodes of active bleeding coming out of nose.       Past Medical History:   Diagnosis Date    Depression      Past Surgical History:   Procedure Laterality Date    CYST REMOVAL      thyroid    DILATION AND CURETTAGE OF UTERUS      THYROGLOSSAL DUCT EXCISION      age 7    TONSILLECTOMY      age 1       Current Outpatient Medications:     buPROPion (WELLBUTRIN XL) 150 MG extended release tablet, Take 1 tablet by mouth every morning, Disp: 30 tablet, Rfl: 3    ciclopirox (PENLAC) 8 % solution, Apply topically nightly., Disp: 6 mL, Rfl: 1  Seasonal  Social History     Tobacco Use    Smoking status: Never     Passive exposure: Never    Smokeless tobacco: Never   Vaping Use    Vaping status: Never Used   Substance Use Topics    Alcohol use: Yes     Comment: occasionally    Drug use: No     Family History   Problem Relation Age of Onset    Diabetes Mother     Arthritis Mother     Cardiomyopathy Mother         Secoondary to viral illness    High Cholesterol Father     High Blood Pressure Father     Diabetes Father     Migraines Sister     No Known Problems Sister     No Known Problems Brother        Review of Systems   Constitutional:  Negative for activity change, fatigue and fever.   HENT:  Negative for

## 2025-05-27 ENCOUNTER — OFFICE VISIT (OUTPATIENT)
Dept: ENT CLINIC | Age: 55
End: 2025-05-27
Payer: COMMERCIAL

## 2025-05-27 VITALS
DIASTOLIC BLOOD PRESSURE: 61 MMHG | BODY MASS INDEX: 20.98 KG/M2 | WEIGHT: 130 LBS | HEART RATE: 77 BPM | SYSTOLIC BLOOD PRESSURE: 96 MMHG

## 2025-05-27 DIAGNOSIS — R43.0 ANOSMIA: Primary | ICD-10-CM

## 2025-05-27 PROCEDURE — 99213 OFFICE O/P EST LOW 20 MIN: CPT | Performed by: OTOLARYNGOLOGY

## 2025-05-28 ASSESSMENT — ENCOUNTER SYMPTOMS
SHORTNESS OF BREATH: 0
COUGH: 0
VOMITING: 0

## 2025-05-28 NOTE — PROGRESS NOTES
[facility]  AMINTA RussellO. Ms.Ed        Patient Name:  Brandie Ojeda  :  1970     CHIEF C/O:    Chief Complaint   Patient presents with    Follow-up     Patient states Flonase is doing ok.        HISTORY OBTAINED FROM:  patient    HISTORY OF PRESENT ILLNESS:       Brandie is a 54 y.o. year old female, here today for follow up of:         History of Present Illness  The patient presents for evaluation of decreased sense of smell.    She reports a persistent decrease in her olfactory function, with occasional instances of perceiving certain odors. She expresses concern regarding the potential impact of this condition on her gustatory function, although she is uncertain if it has been affected.         Past Medical History:   Diagnosis Date    Depression      Past Surgical History:   Procedure Laterality Date    CYST REMOVAL      thyroid    DILATION AND CURETTAGE OF UTERUS      THYROGLOSSAL DUCT EXCISION      age 7    TONSILLECTOMY      age 1       Current Outpatient Medications:     fluticasone (FLONASE) 50 MCG/ACT nasal spray, 2 sprays by Nasal route daily Use R hand to spray into L nostril. Use L hand to spray into R nostril. Spray should be direct towards eye., Disp: 16 g, Rfl: 5    buPROPion (WELLBUTRIN XL) 150 MG extended release tablet, Take 1 tablet by mouth every morning, Disp: 30 tablet, Rfl: 3    ciclopirox (PENLAC) 8 % solution, Apply topically nightly., Disp: 6 mL, Rfl: 1  Environmental/seasonal  Social History     Tobacco Use    Smoking status: Never     Passive exposure: Never    Smokeless tobacco: Never   Vaping Use    Vaping status: Never Used   Substance Use Topics    Alcohol use: Yes     Comment: occasionally    Drug use: No     Family History   Problem Relation Age of Onset    Diabetes Mother     Arthritis Mother     Cardiomyopathy Mother         Secoondary to viral illness    High Cholesterol Father     High Blood Pressure Father     Diabetes Father